# Patient Record
Sex: FEMALE | Race: BLACK OR AFRICAN AMERICAN | Employment: FULL TIME | URBAN - METROPOLITAN AREA
[De-identification: names, ages, dates, MRNs, and addresses within clinical notes are randomized per-mention and may not be internally consistent; named-entity substitution may affect disease eponyms.]

---

## 2024-02-07 ENCOUNTER — APPOINTMENT (OUTPATIENT)
Dept: RADIOLOGY | Facility: HOSPITAL | Age: 59
End: 2024-02-07
Payer: COMMERCIAL

## 2024-02-07 ENCOUNTER — HOSPITAL ENCOUNTER (EMERGENCY)
Facility: HOSPITAL | Age: 59
Discharge: HOME | End: 2024-02-07
Attending: EMERGENCY MEDICINE
Payer: COMMERCIAL

## 2024-02-07 ENCOUNTER — CLINICAL SUPPORT (OUTPATIENT)
Dept: EMERGENCY MEDICINE | Facility: HOSPITAL | Age: 59
End: 2024-02-07
Payer: COMMERCIAL

## 2024-02-07 VITALS
OXYGEN SATURATION: 98 % | HEIGHT: 64 IN | SYSTOLIC BLOOD PRESSURE: 149 MMHG | RESPIRATION RATE: 16 BRPM | DIASTOLIC BLOOD PRESSURE: 94 MMHG | TEMPERATURE: 98.2 F | BODY MASS INDEX: 30.73 KG/M2 | HEART RATE: 63 BPM | WEIGHT: 180 LBS

## 2024-02-07 DIAGNOSIS — S09.90XA HEAD INJURY, INITIAL ENCOUNTER: Primary | ICD-10-CM

## 2024-02-07 LAB
ALBUMIN SERPL BCP-MCNC: 4.5 G/DL (ref 3.4–5)
ALP SERPL-CCNC: 72 U/L (ref 33–110)
ALT SERPL W P-5'-P-CCNC: 25 U/L (ref 7–45)
ANION GAP SERPL CALC-SCNC: 12 MMOL/L (ref 10–20)
AST SERPL W P-5'-P-CCNC: 25 U/L (ref 9–39)
ATRIAL RATE: 71 BPM
BASOPHILS # BLD AUTO: 0.02 X10*3/UL (ref 0–0.1)
BASOPHILS NFR BLD AUTO: 0.4 %
BILIRUB SERPL-MCNC: 0.5 MG/DL (ref 0–1.2)
BUN SERPL-MCNC: 13 MG/DL (ref 6–23)
CALCIUM SERPL-MCNC: 9.8 MG/DL (ref 8.6–10.6)
CARDIAC TROPONIN I PNL SERPL HS: 10 NG/L (ref 0–34)
CHLORIDE SERPL-SCNC: 106 MMOL/L (ref 98–107)
CO2 SERPL-SCNC: 28 MMOL/L (ref 21–32)
CREAT SERPL-MCNC: 0.93 MG/DL (ref 0.5–1.05)
EGFRCR SERPLBLD CKD-EPI 2021: 72 ML/MIN/1.73M*2
EOSINOPHIL # BLD AUTO: 0.12 X10*3/UL (ref 0–0.7)
EOSINOPHIL NFR BLD AUTO: 2.2 %
ERYTHROCYTE [DISTWIDTH] IN BLOOD BY AUTOMATED COUNT: 12 % (ref 11.5–14.5)
GLUCOSE SERPL-MCNC: 97 MG/DL (ref 74–99)
HCT VFR BLD AUTO: 42.4 % (ref 36–46)
HGB BLD-MCNC: 13.7 G/DL (ref 12–16)
IMM GRANULOCYTES # BLD AUTO: 0 X10*3/UL (ref 0–0.7)
IMM GRANULOCYTES NFR BLD AUTO: 0 % (ref 0–0.9)
LYMPHOCYTES # BLD AUTO: 2.15 X10*3/UL (ref 1.2–4.8)
LYMPHOCYTES NFR BLD AUTO: 40 %
MCH RBC QN AUTO: 27.8 PG (ref 26–34)
MCHC RBC AUTO-ENTMCNC: 32.3 G/DL (ref 32–36)
MCV RBC AUTO: 86 FL (ref 80–100)
MONOCYTES # BLD AUTO: 0.32 X10*3/UL (ref 0.1–1)
MONOCYTES NFR BLD AUTO: 6 %
NEUTROPHILS # BLD AUTO: 2.76 X10*3/UL (ref 1.2–7.7)
NEUTROPHILS NFR BLD AUTO: 51.4 %
NRBC BLD-RTO: 0 /100 WBCS (ref 0–0)
P AXIS: 63 DEGREES
P OFFSET: 215 MS
P ONSET: 151 MS
PLATELET # BLD AUTO: 241 X10*3/UL (ref 150–450)
POTASSIUM SERPL-SCNC: 3.6 MMOL/L (ref 3.5–5.3)
PR INTERVAL: 156 MS
PROT SERPL-MCNC: 6.9 G/DL (ref 6.4–8.2)
Q ONSET: 229 MS
QRS COUNT: 12 BEATS
QRS DURATION: 92 MS
QT INTERVAL: 398 MS
QTC CALCULATION(BAZETT): 432 MS
QTC FREDERICIA: 421 MS
R AXIS: 54 DEGREES
RBC # BLD AUTO: 4.93 X10*6/UL (ref 4–5.2)
SODIUM SERPL-SCNC: 142 MMOL/L (ref 136–145)
T AXIS: 18 DEGREES
T OFFSET: 428 MS
VENTRICULAR RATE: 71 BPM
WBC # BLD AUTO: 5.4 X10*3/UL (ref 4.4–11.3)

## 2024-02-07 PROCEDURE — 99285 EMERGENCY DEPT VISIT HI MDM: CPT | Mod: 25

## 2024-02-07 PROCEDURE — 84484 ASSAY OF TROPONIN QUANT: CPT | Performed by: EMERGENCY MEDICINE

## 2024-02-07 PROCEDURE — 93005 ELECTROCARDIOGRAM TRACING: CPT

## 2024-02-07 PROCEDURE — 36415 COLL VENOUS BLD VENIPUNCTURE: CPT | Performed by: EMERGENCY MEDICINE

## 2024-02-07 PROCEDURE — 85025 COMPLETE CBC W/AUTO DIFF WBC: CPT | Performed by: EMERGENCY MEDICINE

## 2024-02-07 PROCEDURE — 99285 EMERGENCY DEPT VISIT HI MDM: CPT | Performed by: EMERGENCY MEDICINE

## 2024-02-07 PROCEDURE — 2500000004 HC RX 250 GENERAL PHARMACY W/ HCPCS (ALT 636 FOR OP/ED): Mod: SE | Performed by: STUDENT IN AN ORGANIZED HEALTH CARE EDUCATION/TRAINING PROGRAM

## 2024-02-07 PROCEDURE — 80053 COMPREHEN METABOLIC PANEL: CPT | Performed by: EMERGENCY MEDICINE

## 2024-02-07 PROCEDURE — 70450 CT HEAD/BRAIN W/O DYE: CPT

## 2024-02-07 PROCEDURE — 70450 CT HEAD/BRAIN W/O DYE: CPT | Performed by: RADIOLOGY

## 2024-02-07 PROCEDURE — 96372 THER/PROPH/DIAG INJ SC/IM: CPT

## 2024-02-07 RX ORDER — KETOROLAC TROMETHAMINE 30 MG/ML
30 INJECTION, SOLUTION INTRAMUSCULAR; INTRAVENOUS ONCE
Status: COMPLETED | OUTPATIENT
Start: 2024-02-07 | End: 2024-02-07

## 2024-02-07 RX ORDER — HYDROCHLOROTHIAZIDE 25 MG/1
25 TABLET ORAL
COMMUNITY
Start: 2023-11-29

## 2024-02-07 RX ORDER — LEVOTHYROXINE SODIUM 100 UG/1
100 TABLET ORAL
COMMUNITY
Start: 2023-12-03

## 2024-02-07 RX ADMIN — KETOROLAC TROMETHAMINE 30 MG: 30 INJECTION, SOLUTION INTRAMUSCULAR; INTRAVENOUS at 22:27

## 2024-02-07 ASSESSMENT — COLUMBIA-SUICIDE SEVERITY RATING SCALE - C-SSRS
2. HAVE YOU ACTUALLY HAD ANY THOUGHTS OF KILLING YOURSELF?: NO
1. IN THE PAST MONTH, HAVE YOU WISHED YOU WERE DEAD OR WISHED YOU COULD GO TO SLEEP AND NOT WAKE UP?: NO
6. HAVE YOU EVER DONE ANYTHING, STARTED TO DO ANYTHING, OR PREPARED TO DO ANYTHING TO END YOUR LIFE?: NO

## 2024-02-08 NOTE — ED TRIAGE NOTES
PT to the ED with a headache after running into a wall while walking. Additional c/o of dizziness and blurry vision.

## 2024-02-08 NOTE — DISCHARGE INSTRUCTIONS
You are noted to be slightly hypertensive on your vital signs.  I recommend you follow-up with the family's physician.  A primary care appointment can be made at [phone].    Additionally you were seen in the emergency department for head injury.  Your CT did not reveal any fracture/any head bleed.  You were administered pain medications for this headache.  If headache persists I recommend you follow-up with the concussion clinic.  Appointments can be made at [phone].  You can continue to use Tylenol/Motrin for associated headache.  Please return to the emergency department if you have any worsening symptoms such as numbness/weakness/syncope or near syncope.

## 2024-02-08 NOTE — ED PROVIDER NOTES
CC: Head Injury     HPI:  Patient a 58-year-old female presenting emergency room the chief concern of head injury.  Patient states that head injury occurred around approximately 2 AM.  Patient states that she was ambulating about her house without the lights on when she struck her left forehead against a wall.  Patient states that she had no loss of consciousness notes that she applied ice and lie down.  Patient reports that she had headache throughout the day, is concern for vague denies any loss of consciousness, denies blood thinners dizziness however denies specific vertigo.,  Denies any other associated injury with this episode.  Patient reports that she utilize migraine medication with some relief.  She otherwise denies any fevers/chills, changes in bowel or bladder function, nausea or vomiting, new focal numbness or weakness to bilateral arms, legs, face.    Records Reviewed:  Recent available ED and inpatient notes reviewed in EMR.    PMHx/PSHx:  Per HPI.   - has no past medical history on file.  - has no past surgical history on file.    Medications:  Reviewed in EMR. See EMR for complete list of medications and doses.    Allergies:  Patient has no known allergies.    Social History:  - Tobacco:  has no history on file for tobacco use.   - Alcohol:  has no history on file for alcohol use.   - Illicit Drugs:  has no history on file for drug use.     ROS:  Per HPI.       ???????????????????????????????????????????????????????????????  Triage Vitals:  T 36.4 °C (97.5 °F)  HR 86  BP (!) 178/116  RR 18  O2 100 %      Physical Exam  Vitals and nursing note reviewed.   Constitutional:       General: She is not in acute distress.     Appearance: Normal appearance. She is not toxic-appearing.   HENT:      Head: Normocephalic and atraumatic.      Comments: No noted ecchymoses, cephalhematoma appreciated on physical examination.     Nose: No congestion or rhinorrhea.      Mouth/Throat:      Mouth: Mucous membranes  are moist.      Pharynx: Oropharynx is clear.   Eyes:      Extraocular Movements: Extraocular movements intact.      Conjunctiva/sclera: Conjunctivae normal.      Pupils: Pupils are equal, round, and reactive to light.   Cardiovascular:      Rate and Rhythm: Normal rate and regular rhythm.      Pulses: Normal pulses.      Heart sounds: No murmur heard.     No friction rub. No gallop.   Pulmonary:      Effort: Pulmonary effort is normal.      Breath sounds: No wheezing, rhonchi or rales.   Abdominal:      General: There is no distension.      Palpations: Abdomen is soft.      Tenderness: There is no abdominal tenderness. There is no guarding or rebound.   Musculoskeletal:         General: No swelling, deformity or signs of injury. Normal range of motion.      Cervical back: Normal range of motion.      Right lower leg: No edema.      Left lower leg: No edema.   Skin:     General: Skin is warm and dry.      Capillary Refill: Capillary refill takes less than 2 seconds.      Findings: No bruising, lesion or rash.   Neurological:      General: No focal deficit present.      Mental Status: She is alert and oriented to person, place, and time. Mental status is at baseline.      Comments: Neuro:  MENTAL STATE:    Orientation was normal to person, place, and time. Recent and remote memory was intact. Attention span and concentration were intact. Language testing was grossly intact for comprehension and expression. Was able to name and identify 3 objects.    CRANIAL NERVES:    CN 2    Visual Acuity grossly normal, patient has not noted any changes   CN 3, 4, 6    Pupils equal and reactive to light   Lids symmetric; no ptosis. EOMs intact bilaterally    CN 5    Facial sensation intact bilaterally.    CN 7    Normal and symmetric facial strength. Nasolabial folds symmetric.    CN 8    Hearing grossly intact to conversation   CN 9    Palate elevates symmetrically.    CN 11               Able to use SCM and Trapezius against  resistance   CN 12    Tongue midline, with normal bulk and strength; no fasciculations.     MOTOR:      Upper extremity motor function intact to extension and flexion      Lower extremity motor function intact to extension and flexion    SENSORY:     Sensation intact in upper and lower extremities    GAIT:     Patient able to ambulate with normal gait    COORDINATION:     Coordination intact to finger-to-nose and fine motor movements of the hand   Psychiatric:         Mood and Affect: Mood normal.         Thought Content: Thought content normal.         Judgment: Judgment normal.       ???????????????????????????????????????????????????????????????  EKG:  EKG obtained at 2004 it is noted to be normal sinus rhythm with a ventricular rate of 71 bpm, NH interval of 156, QRS duration of 92, QTc of 432 there are no significant T wave inversions, no significant ST elevations, overall interpretation is a normal EKG with no signs of ischemia or infarction, no prior EKG availaable for comparison.    Assessment and Plan:  Patient is a 58-year-old female presented to the emergency department with a chief concern of head strike with no loss of consciousness, no reported blood thinner use.  She is presenting the emergency department for evaluation of head injury.  Patient reports no associated loss consciousness, no associated nausea/vomiting, no accompanying injury.  Prior to my evaluation patient has multiple labs and imaging modalities ordered in triage.  Patient has a CT head which is unremarkable for any skull fracture, intracranial hemorrhage.  Patient has no associated photophobia on examination patient has a normal gait, normal fine motor movements of the hand, normal to finger-nose testing with no associated nystagmus on physical examination.  Patient otherwise has 5/5 strength in bilateral upper and lower extremities, no sensory deficits, no other neurologic findings on physical examination.  Patient may have slight  concussion, patient does report relief of pain with over-the-counter pain medications which I recommend that she continue to take.  She will be provided with Toradol shot in the emergency department for headache.  She is advised to follow-up in concussion clinic if symptoms persist.  The patient is agreeable this plan agreeable to discharge and is discharged from the emergency department in stable condition.    ED Course:  Diagnoses as of 02/08/24 0853   Head injury, initial encounter      As noted above  Social Determinants Limiting Care:      Disposition:  Discharge    Jozef Francis MD       Procedures ? [employer] last updated 2/7/2024 10:08 PM        Jozef Francis MD  Resident  02/07/24 2225       Jozef Francis MD  Resident  02/08/24 0853

## 2024-08-19 ENCOUNTER — HOSPITAL ENCOUNTER (EMERGENCY)
Facility: HOSPITAL | Age: 59
Discharge: HOME | End: 2024-08-19
Payer: COMMERCIAL

## 2024-08-19 ENCOUNTER — APPOINTMENT (OUTPATIENT)
Dept: RADIOLOGY | Facility: HOSPITAL | Age: 59
End: 2024-08-19
Payer: COMMERCIAL

## 2024-08-19 VITALS
WEIGHT: 190 LBS | HEART RATE: 58 BPM | HEIGHT: 64 IN | OXYGEN SATURATION: 98 % | BODY MASS INDEX: 32.44 KG/M2 | RESPIRATION RATE: 20 BRPM | TEMPERATURE: 97.7 F | DIASTOLIC BLOOD PRESSURE: 114 MMHG | SYSTOLIC BLOOD PRESSURE: 166 MMHG
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
## 2024-08-19 DIAGNOSIS — S49.91XA INJURY OF RIGHT UPPER EXTREMITY, INITIAL ENCOUNTER: ICD-10-CM

## 2024-08-19 PROBLEM — I10 HTN (HYPERTENSION): Status: ACTIVE | Noted: 2019-08-19

## 2024-08-19 PROBLEM — R73.03 PRE-DIABETES: Status: ACTIVE | Noted: 2020-01-08

## 2024-08-19 PROBLEM — M25.561 ACUTE PAIN OF RIGHT KNEE: Status: ACTIVE | Noted: 2021-12-13

## 2024-08-19 PROCEDURE — 96372 THER/PROPH/DIAG INJ SC/IM: CPT | Performed by: NURSE PRACTITIONER

## 2024-08-19 PROCEDURE — 2500000004 HC RX 250 GENERAL PHARMACY W/ HCPCS (ALT 636 FOR OP/ED): Mod: SE | Performed by: NURSE PRACTITIONER

## 2024-08-19 PROCEDURE — 73080 X-RAY EXAM OF ELBOW: CPT | Mod: RT

## 2024-08-19 PROCEDURE — 73110 X-RAY EXAM OF WRIST: CPT | Mod: RT

## 2024-08-19 PROCEDURE — 73110 X-RAY EXAM OF WRIST: CPT | Mod: RIGHT SIDE | Performed by: RADIOLOGY

## 2024-08-19 PROCEDURE — 73030 X-RAY EXAM OF SHOULDER: CPT | Mod: RIGHT SIDE | Performed by: RADIOLOGY

## 2024-08-19 PROCEDURE — 73080 X-RAY EXAM OF ELBOW: CPT | Mod: RIGHT SIDE | Performed by: RADIOLOGY

## 2024-08-19 PROCEDURE — 73130 X-RAY EXAM OF HAND: CPT | Mod: RIGHT SIDE | Performed by: RADIOLOGY

## 2024-08-19 PROCEDURE — 73030 X-RAY EXAM OF SHOULDER: CPT | Mod: RT

## 2024-08-19 PROCEDURE — 73130 X-RAY EXAM OF HAND: CPT | Mod: RT

## 2024-08-19 PROCEDURE — 99284 EMERGENCY DEPT VISIT MOD MDM: CPT

## 2024-08-19 RX ORDER — KETOROLAC TROMETHAMINE 30 MG/ML
30 INJECTION, SOLUTION INTRAMUSCULAR; INTRAVENOUS ONCE
Status: COMPLETED | OUTPATIENT
Start: 2024-08-19 | End: 2024-08-19

## 2024-08-19 RX ORDER — NAPROXEN 500 MG/1
500 TABLET ORAL 2 TIMES DAILY PRN
Qty: 20 TABLET | Refills: 0 | Status: SHIPPED | OUTPATIENT
Start: 2024-08-19

## 2024-08-19 ASSESSMENT — PAIN SCALES - GENERAL: PAINLEVEL_OUTOF10: 10 - WORST POSSIBLE PAIN

## 2024-08-19 ASSESSMENT — PAIN - FUNCTIONAL ASSESSMENT: PAIN_FUNCTIONAL_ASSESSMENT: 0-10

## 2024-08-19 ASSESSMENT — PAIN DESCRIPTION - PAIN TYPE: TYPE: ACUTE PAIN

## 2024-08-19 NOTE — ED PROVIDER NOTES
HPI   Chief Complaint   Patient presents with    Fall       Patient is healthy nontoxic-appearing 58-year-old female with a past medical history of acute pain in the right knee, cervalgia, hypertension, hypothyroidism, lumbar ago, presents to the emergency today for complaint of fall.  Patient states she was tending to use a revolving door at a bank earlier today.  Patient states she pushed on a door and was thrown forward.  Patient states she injured her right shoulder, right elbow, right wrist and right hand in the process.  Patient denies striking her head, loss of consciousness, headache pain visual disturbances, numbness or tingling.  Patient states she has had intense pain to the right arm since the fall.  Patient is right-hand dominant.  Patient denies taking any medications prior to arrival.  Patient denies any neck pain, chest pain, shortness of breath difficulty breathing, lightheadedness, dizziness, syncopal or syncopal events, abdominal pain, nausea vomiting or diarrhea or constipation, fever, shaking, or chills.              Patient History   History reviewed. No pertinent past medical history.  History reviewed. No pertinent surgical history.  No family history on file.  Social History     Tobacco Use    Smoking status: Never    Smokeless tobacco: Never   Substance Use Topics    Alcohol use: Not Currently    Drug use: Not Currently       Physical Exam   ED Triage Vitals [08/19/24 1320]   Temperature Heart Rate Respirations BP   36.5 °C (97.7 °F) 58 20 (!) 166/114      Pulse Ox Temp Source Heart Rate Source Patient Position   98 % Oral Monitor --      BP Location FiO2 (%)     -- 21 %       Physical Exam  Vitals and nursing note reviewed.   Constitutional:       General: She is not in acute distress.     Appearance: Normal appearance. She is not ill-appearing, toxic-appearing or diaphoretic.   HENT:      Head: Normocephalic.   Eyes:      General:         Right eye: No discharge.         Left eye: No  discharge.      Extraocular Movements: Extraocular movements intact.      Pupils: Pupils are equal, round, and reactive to light.   Cardiovascular:      Rate and Rhythm: Normal rate and regular rhythm.      Pulses: Normal pulses.      Heart sounds: Normal heart sounds. No murmur heard.     No friction rub. No gallop.   Pulmonary:      Effort: Pulmonary effort is normal. No respiratory distress.      Breath sounds: Normal breath sounds. No stridor. No wheezing, rhonchi or rales.   Chest:      Chest wall: No tenderness.   Musculoskeletal:         General: Tenderness and signs of injury present. No swelling or deformity. Normal range of motion.      Cervical back: Normal range of motion and neck supple.      Right lower leg: No edema.      Left lower leg: No edema.      Comments: Diffuse pain with light palpation over the right shoulder, right elbow, right wrist and right hand, decreased range of motion secondary to discomfort, radial pulses strong regular, hand grasp strong regular, no anatomical snuffbox tenderness upon palpation,   Skin:     General: Skin is warm.      Capillary Refill: Capillary refill takes less than 2 seconds.      Coloration: Skin is not jaundiced or pale.      Findings: No bruising, erythema, lesion or rash.   Neurological:      General: No focal deficit present.      Mental Status: She is alert and oriented to person, place, and time.           ED Course & MDM   ED Course as of 08/19/24 1700   Mon Aug 19, 2024   1652 X-rays of the right shoulder, right elbow, right hand and right wrist reveal  Right shoulder:  There is no acute fracture or malalignment. There are mild  degenerative changes at glenohumeral and acromioclavicular joint. The  soft tissue appears unremarkable.      Right elbow:  There is no acute fracture or malalignment. No joint effusion is  seen. The soft tissue appears unremarkable.      Right hand:  There is no acute fracture or malalignment. The joint spaces are  maintained  without significant degenerative changes. Soft tissue  appears unremarkable.      Right wrist:  There is no acute fracture or malalignment. The soft tissue appears  unremarkable.   [EC]      ED Course User Index  [EC] HANANE Stoner         Diagnoses as of 08/19/24 1700   Fall, initial encounter   Injury of right upper extremity, initial encounter                 No data recorded     Melchor Coma Scale Score: 15 (08/19/24 1323 : Kimberly Ramirez RN)                           Medical Decision Making  Given patient's complaint and presentation a thorough exam was performed.  On exam patient has Diffuse pain with light palpation over the right shoulder, right elbow, right wrist and right hand, decreased range of motion secondary to discomfort, radial pulses strong regular, hand grasp strong regular, no anatomical snuffbox tenderness upon palpation, remains neurologically intact no focal deficits, no nuchal rigidity, has no midline cervical, thoracic or lumbar spinal tenderness no crepitus no step-offs upon palpation, no adventitious lung sounds auscultated, speaking pleat sentences no respiratory distress, cardiac sounds auscultated are regular, I have a low suspicion for vascular compromise, flexor extensor tendon injury.  X-ray was ordered of the right shoulder, right elbow, wrist and hand.  Imaging as interpreted by radiologist reveals no acute osseous abnormality, fracture or dislocation.  Patient received Toradol injection in the emergency room today as well.  Given mechanism injury I suspect patient is experiencing sprain of the right arm secondary to fall.  Patient received scription for naproxen and I encouraged monitoring symptoms, if they become worse return to the emergency room for further evaluation, otherwise follow primary care provider as needed.  Patient was agreeable this plan and discharged home in stable condition.    HANANE Stoner     Portions of this note were generated using  digital voice recognition software, and may contain grammatical errors      Procedure  Procedures     Kevin Pozo, APRN-CNP  08/19/24 7631

## 2024-08-19 NOTE — ED TRIAGE NOTES
"Pt states [place] [employer] [place] and when she went to exit building the revolving door \"threw her\" pt states her lower back and right arm and hand as well as her left leg. Pt is hypertensive but did not take her bp meds this morning.   "

## 2024-08-21 ENCOUNTER — HOSPITAL ENCOUNTER (EMERGENCY)
Facility: HOSPITAL | Age: 59
Discharge: HOME | End: 2024-08-21
Attending: EMERGENCY MEDICINE
Payer: COMMERCIAL

## 2024-08-21 VITALS
TEMPERATURE: 96.8 F | BODY MASS INDEX: 31.41 KG/M2 | OXYGEN SATURATION: 95 % | RESPIRATION RATE: 16 BRPM | SYSTOLIC BLOOD PRESSURE: 149 MMHG | HEART RATE: 76 BPM | WEIGHT: 184 LBS | DIASTOLIC BLOOD PRESSURE: 100 MMHG | HEIGHT: 64 IN

## 2024-08-21 DIAGNOSIS — S60.221A CONTUSION OF DORSUM OF RIGHT HAND: Primary | ICD-10-CM

## 2024-08-21 PROCEDURE — 96372 THER/PROPH/DIAG INJ SC/IM: CPT | Performed by: PHYSICIAN ASSISTANT

## 2024-08-21 PROCEDURE — 2500000004 HC RX 250 GENERAL PHARMACY W/ HCPCS (ALT 636 FOR OP/ED): Mod: SE | Performed by: PHYSICIAN ASSISTANT

## 2024-08-21 PROCEDURE — 99283 EMERGENCY DEPT VISIT LOW MDM: CPT

## 2024-08-21 PROCEDURE — 99284 EMERGENCY DEPT VISIT MOD MDM: CPT | Performed by: PHYSICIAN ASSISTANT

## 2024-08-21 RX ORDER — KETOROLAC TROMETHAMINE 30 MG/ML
30 INJECTION, SOLUTION INTRAMUSCULAR; INTRAVENOUS ONCE
Status: COMPLETED | OUTPATIENT
Start: 2024-08-21 | End: 2024-08-21

## 2024-08-21 ASSESSMENT — PAIN DESCRIPTION - PAIN TYPE: TYPE: ACUTE PAIN

## 2024-08-21 ASSESSMENT — PAIN SCALES - WONG BAKER: WONGBAKER_NUMERICALRESPONSE: HURTS WHOLE LOT

## 2024-08-21 ASSESSMENT — PAIN DESCRIPTION - LOCATION: LOCATION: WRIST

## 2024-08-21 ASSESSMENT — PAIN DESCRIPTION - ORIENTATION
ORIENTATION: RIGHT
ORIENTATION: RIGHT

## 2024-08-21 ASSESSMENT — PAIN - FUNCTIONAL ASSESSMENT: PAIN_FUNCTIONAL_ASSESSMENT: 0-10

## 2024-08-21 ASSESSMENT — PAIN SCALES - GENERAL
PAINLEVEL_OUTOF10: 7
PAINLEVEL_OUTOF10: 10 - WORST POSSIBLE PAIN

## 2024-08-21 NOTE — ED TRIAGE NOTES
Patient presents to the Emergency department with a chief complaint of right hand pain. Patient states she fell while opening a door yesterday. She was seen for this, but her hand is continuing to hurt.

## 2024-08-21 NOTE — ED PROVIDER NOTES
Emergency Department Provider Note        History of Present Illness     History provided by: Patient  Limitations to History: None  External Records Reviewed with Brief Summary: visit and imaging from 8/18    HPI:  Patient is a 58-year-old female who presents today for right hand pain, patient was involved in an accident 2 days ago where she was going for a revolving door and the door jerked forward causing her to fall forward, patient states she landed with her right arm tucked under her, she states the rest of her pain in her arm is getting better but the hand is still swollen, patient is wondering if she can get a splint.  She states it is only the top of the hand, she is endorses it is worse with movement, she has been taking naproxen every 12 hours as prescribed but states she is due for another dose currently.  She states the Toradol really helped last time.  Denies any new injuries or traumas.  No numbness or tingling or pain that is severe.    Physical Exam   Triage vitals:  T 36 °C (96.8 °F)  HR 76  BP (!) 171/107  RR 16  O2 95 % None (Room air)    Physical Exam  Vitals and nursing note reviewed.   Constitutional:       General: She is not in acute distress.     Appearance: Normal appearance. She is not toxic-appearing.   HENT:      Head: Normocephalic and atraumatic.      Nose: Nose normal.   Eyes:      Extraocular Movements: Extraocular movements intact.   Cardiovascular:      Rate and Rhythm: Normal rate and regular rhythm.   Pulmonary:      Effort: Pulmonary effort is normal.   Abdominal:      Palpations: Abdomen is soft.   Musculoskeletal:         General: Normal range of motion.      Cervical back: Normal range of motion and neck supple.      Comments:     Mild swelling to the dorsal right hand, no bruising warmth or erythema, soft compressible compartments, 2+ radial pulse, cap refill to all 5 digits intact, cap refill under 2 seconds, patient has full flexion extension at MCP PIP and DIP at all  5 digits including against resistance, no snuffbox tenderness, no tenderness to the wrist forearm or elbow.     Skin:     General: Skin is warm and dry.   Neurological:      General: No focal deficit present.      Mental Status: She is alert.   Psychiatric:         Mood and Affect: Mood normal.         Thought Content: Thought content normal.          Medical Decision Making & ED Course   Medical Decision Making:    MDM: Patient is a 58-year-old female presents today for evaluation of ongoing right hand pain after being seen for an injury where she fell onto her hand 2 days ago, she had an x-ray at the time that was negative, patient's pain is mostly along the dorsal aspect of the hand, she was more concerned that it was swollen, she is requesting a splint, given that patient has no fractures do not feel that it would be beneficial to immobilize her however I did offer her an Ace wrap which she gladly accepted, this was applied by myself, neurovascularly intact following application, do not feel that there is any indication for repeat imaging, no concern for vascular, neurological compromise, no concerns for compartment syndrome or occult fracture.  Patient provided with an ice pack, Ace wrap, will give her hand surgery follow-up in the event of ongoing pain or symptoms. D/w Dr Silverman given second visit for same complaint and he agrees with plan of care. Repeat /100, suspect mildly elevated secondary to pain.  ----      Differential diagnoses considered include but are not limited to: contusion, sprain, fracture, compartment syndrome, hematoma, strain     Social Determinants of Health which Significantly Impact Care: None identified     EKG Independent Interpretation: EKG interpreted by myself. Please see ED Course for full interpretation.    Independent Result Review and Interpretation: None obtained    Chronic conditions affecting the patient's care: None    The patient was discussed with the following  consultants/services: None    Care Considerations: As documented above in [facility]    ED Course:  Diagnoses as of 08/21/24 1831   Contusion of dorsum of right hand     Disposition   As a result of the work-up, the patient was discharged home.  she was informed of her diagnosis and instructed to come back with any concerns or worsening of condition.  she and was agreeable to the plan as discussed above.  she was given the opportunity to ask questions.  All of the patient's questions were answered.    Procedures   Procedures    This was a shared visit with an ED attending.  The patient was seen and discussed with the ED attending    Jessica Rich PA-C  Emergency Medicine       Jessica Rich PA-C  08/21/24 2040

## 2024-10-10 ENCOUNTER — APPOINTMENT (OUTPATIENT)
Dept: RADIOLOGY | Facility: HOSPITAL | Age: 59
End: 2024-10-10
Payer: COMMERCIAL

## 2024-10-10 ENCOUNTER — HOSPITAL ENCOUNTER (EMERGENCY)
Facility: HOSPITAL | Age: 59
Discharge: HOME | End: 2024-10-10
Payer: COMMERCIAL

## 2024-10-10 VITALS
OXYGEN SATURATION: 99 % | WEIGHT: 189 LBS | TEMPERATURE: 98.6 F | SYSTOLIC BLOOD PRESSURE: 149 MMHG | BODY MASS INDEX: 32.27 KG/M2 | HEIGHT: 64 IN | DIASTOLIC BLOOD PRESSURE: 88 MMHG | HEART RATE: 82 BPM | RESPIRATION RATE: 16 BRPM

## 2024-10-10 DIAGNOSIS — S60.511D ABRASION OF RIGHT HAND, SUBSEQUENT ENCOUNTER: Primary | ICD-10-CM

## 2024-10-10 PROCEDURE — 73130 X-RAY EXAM OF HAND: CPT | Mod: RT

## 2024-10-10 PROCEDURE — 73130 X-RAY EXAM OF HAND: CPT | Mod: RIGHT SIDE | Performed by: RADIOLOGY

## 2024-10-10 PROCEDURE — 99284 EMERGENCY DEPT VISIT MOD MDM: CPT

## 2024-10-10 PROCEDURE — 96372 THER/PROPH/DIAG INJ SC/IM: CPT

## 2024-10-10 PROCEDURE — 2500000004 HC RX 250 GENERAL PHARMACY W/ HCPCS (ALT 636 FOR OP/ED): Mod: SE

## 2024-10-10 PROCEDURE — 99283 EMERGENCY DEPT VISIT LOW MDM: CPT

## 2024-10-10 RX ORDER — ACETAMINOPHEN 325 MG/1
650 TABLET ORAL EVERY 6 HOURS PRN
Qty: 20 TABLET | Refills: 0 | Status: SHIPPED | OUTPATIENT
Start: 2024-10-10 | End: 2024-10-15

## 2024-10-10 RX ORDER — IBUPROFEN 600 MG/1
600 TABLET ORAL EVERY 6 HOURS PRN
Qty: 16 TABLET | Refills: 0 | Status: SHIPPED | OUTPATIENT
Start: 2024-10-10 | End: 2024-10-14

## 2024-10-10 RX ORDER — KETOROLAC TROMETHAMINE 30 MG/ML
30 INJECTION, SOLUTION INTRAMUSCULAR; INTRAVENOUS ONCE
Status: COMPLETED | OUTPATIENT
Start: 2024-10-10 | End: 2024-10-10

## 2024-10-10 RX ADMIN — KETOROLAC TROMETHAMINE 30 MG: 30 INJECTION, SOLUTION INTRAMUSCULAR; INTRAVENOUS at 09:16

## 2024-10-10 ASSESSMENT — COLUMBIA-SUICIDE SEVERITY RATING SCALE - C-SSRS
6. HAVE YOU EVER DONE ANYTHING, STARTED TO DO ANYTHING, OR PREPARED TO DO ANYTHING TO END YOUR LIFE?: NO
2. HAVE YOU ACTUALLY HAD ANY THOUGHTS OF KILLING YOURSELF?: NO
1. IN THE PAST MONTH, HAVE YOU WISHED YOU WERE DEAD OR WISHED YOU COULD GO TO SLEEP AND NOT WAKE UP?: NO

## 2024-10-10 ASSESSMENT — PAIN - FUNCTIONAL ASSESSMENT: PAIN_FUNCTIONAL_ASSESSMENT: 0-10

## 2024-10-10 ASSESSMENT — PAIN SCALES - GENERAL
PAINLEVEL_OUTOF10: 8
PAINLEVEL_OUTOF10: 8

## 2024-10-10 NOTE — ED PROVIDER NOTES
HPI   Chief Complaint   Patient presents with    Hand Pain       59-year-old female presents for chief complaint of hand pain.  Worse in the right third digit.  Ongoing since fall in August.  X-rays were obtained at that time and were negative for any fractures or dislocations.  Patient was recommended to follow-up with hand surgery but she has not done that yet.  Endorses persistent pain mostly in her middle finger but now extending and radiating into her hand.  Endorses some mild swelling but without redness in the middle finger.  Denies any other complaints.              Patient History   No past medical history on file.  No past surgical history on file.  No family history on file.  Social History     Tobacco Use    Smoking status: Never    Smokeless tobacco: Never   Substance Use Topics    Alcohol use: Not Currently    Drug use: Not Currently       Physical Exam   ED Triage Vitals [10/10/24 0834]   Temperature Heart Rate Respirations BP   36.1 °C (96.9 °F) 85 17 (!) 161/95      Pulse Ox Temp Source Heart Rate Source Patient Position   97 % Temporal Monitor --      BP Location FiO2 (%)     -- --       Physical Exam  Vitals and nursing note reviewed.   Constitutional:       General: She is not in acute distress.     Appearance: She is well-developed.   HENT:      Head: Normocephalic and atraumatic.   Eyes:      Conjunctiva/sclera: Conjunctivae normal.   Cardiovascular:      Rate and Rhythm: Normal rate and regular rhythm.      Heart sounds: No murmur heard.  Pulmonary:      Effort: Pulmonary effort is normal. No respiratory distress.      Breath sounds: Normal breath sounds.   Abdominal:      Palpations: Abdomen is soft.      Tenderness: There is no abdominal tenderness.   Musculoskeletal:         General: No swelling.      Cervical back: Neck supple.      Comments: Mild swelling without redness and mild tenderness to the right middle finger.  Range of motion slightly decreased with extension in this finger.  All  other range of motion intact.  MSPs intact in all extremities.  Cap refill less than 2 seconds in all digits.   Skin:     General: Skin is warm and dry.      Capillary Refill: Capillary refill takes less than 2 seconds.   Neurological:      Mental Status: She is alert.   Psychiatric:         Mood and Affect: Mood normal.           ED Course & MDM   Diagnoses as of 10/10/24 1046   Abrasion of right hand, subsequent encounter                 No data recorded     Melchor Coma Scale Score: 15 (10/10/24 0835 : Ivory Fish RN)                           Medical Decision Making  Vital signs reviewed, significant for hypertension at 161/95.  Patient overall is well-appearing and in no apparent distress.  Speaks full sentences but difficulty.  Toradol given for pain control.  Focus splint given per patient's request.  X-rays were repeated due to persistent pain and new swelling in the right middle finger.  Denies any new injuries since August.  Low suspicion of fracture at this point.  Likely needs to follow-up with hand surgery.  Will be given follow-up at discharge.  X-ray shows no evidence of acute fractures or dislocations but does show some soft tissue swelling.  Patient was advised to follow-up with hand surgery soon as possible and to take Tylenol Motrin as needed for pain.  Advised to return with any new or worsening symptoms.  Patient in agreement this plan.  Discharged in stable condition.        Procedure  Procedures     Bharat Cook, APRN-CNP  10/10/24 5983

## 2024-10-21 ENCOUNTER — APPOINTMENT (OUTPATIENT)
Dept: ORTHOPEDIC SURGERY | Facility: HOSPITAL | Age: 59
End: 2024-10-21
Payer: COMMERCIAL

## 2024-10-29 ENCOUNTER — APPOINTMENT (OUTPATIENT)
Dept: ORTHOPEDIC SURGERY | Facility: HOSPITAL | Age: 59
End: 2024-10-29
Payer: COMMERCIAL

## 2024-11-11 ENCOUNTER — OFFICE VISIT (OUTPATIENT)
Dept: ORTHOPEDIC SURGERY | Facility: HOSPITAL | Age: 59
End: 2024-11-11
Payer: COMMERCIAL

## 2024-11-11 DIAGNOSIS — M79.641 RIGHT HAND PAIN: Primary | ICD-10-CM

## 2024-11-11 PROCEDURE — 99203 OFFICE O/P NEW LOW 30 MIN: CPT | Performed by: STUDENT IN AN ORGANIZED HEALTH CARE EDUCATION/TRAINING PROGRAM

## 2024-11-11 PROCEDURE — 99213 OFFICE O/P EST LOW 20 MIN: CPT | Performed by: STUDENT IN AN ORGANIZED HEALTH CARE EDUCATION/TRAINING PROGRAM

## 2024-11-11 ASSESSMENT — PAIN - FUNCTIONAL ASSESSMENT: PAIN_FUNCTIONAL_ASSESSMENT: 0-10

## 2024-11-11 ASSESSMENT — PAIN SCALES - GENERAL: PAINLEVEL_OUTOF10: 9

## 2024-11-11 ASSESSMENT — PAIN DESCRIPTION - DESCRIPTORS: DESCRIPTORS: TINGLING;NUMBNESS

## 2024-11-25 NOTE — PROGRESS NOTES
[facility]   Hand and Upper Extremity Service  Initial evaluation / Consultation        Consult requested by Referring Physician: None     Chief Complaint: Right upper extremity pain     Patient is a 59-year-old female who presents with right upper extremity pain.  She notes that on October 19, 2024 she had a fall at the [employer] building in [place].  She states that she was leaving the bank when she went through a revolving doors.  She states that the door stopped or slowed down in front of her and when she put out her arm she felt that she got electrocuted.  I clarified if she meant that she felt a shock or shooting type pain or if she truly thought she was electrocuted.  She states that she thinks she was truly electrocuted.  She states she fell on the floor and has had severe pain to her right upper extremity since then.  She notes that is traveling down her whole upper extremity.  She does note a numbness and tingling feeling.  She states this is a 9 out of 10.     She is right-hand dominant.  She works in childcare.     Please refer to New Patient Intake Form scanned into patient's electronic record for self reported past medical history, past surgical history, medications, allergies, family history, social history and 10 point review of systems     Examination:  Constitutional: Oriented to person, place, and time.  Appears well-developed and well-nourished.  Head: Normocephalic and atraumatic.  Eyes: Pupils are equal, round, and reactive to light.  Cardiovascular: Intact distal pulses.   Pulmonary/Chest/Breast: Effort normal. No respiratory distress.  Neurological: Alert and oriented to person, place, and time.  Skin: Skin is warm and dry.  Psychiatric: normal mood and affect.  Behavior is normal.  Musculoskeletal: Right upper extremity  There are no deformities present.  There is no abnormal markings on her skin or burns.  Her right upper extremity is symmetrical to her  left upper extremity.  She is able to flex and extend the MP and IP joints.  She is able to fully move her wrist elbow and shoulder.  She has tender to palpation diffusely.  She has a positive Tinel's at her carpal tunnel, cubital tunnel, Guyon's canal, and diffusely.  This does not follow any specific nerve or dermatome distributions.  AIN, PIN, ulnar motor nerves intact.  Sensation intact to light touch radial, ulnar, median nerves.       Personal Interpretation of Diagnostic studies:   No new images were obtained.  Previous x-rays of her right upper extremity reviewed.  There are no fractures or dislocations noted         Impression: Right upper extremity pain         In Office Procedures Performed: None         Medical Decision Making:   Unfortunately cannot delineate where her pain is coming from on exam.  She is diffusely tender to the right upper extremity.  There is concerned that she works in childcare and uses her right hand.  At this point I will refer her to occupational therapy to see if they can provide her with any assistance.  She will follow-up she did not see any improvement.       Medications Prescribed: None        Follow up: As needed           Will DO Mis  [facility]  Department of Orthopaedic Surgery  Hand and Upper Extremity Reconstruction           Dictation performed with the use of voice recognition software.  Syntax and grammatical errors may exist.

## 2024-11-27 ENCOUNTER — DOCUMENTATION (OUTPATIENT)
Dept: OCCUPATIONAL THERAPY | Facility: CLINIC | Age: 59
End: 2024-11-27
Payer: COMMERCIAL

## 2024-11-27 NOTE — PROGRESS NOTES
Occupational Therapy                 Therapy Communication Note    Patient Name: Dianne Barahona  MRN: [id number]  Department:   Room: Room/bed info not found  Today's Date: 11/27/2024     Discipline: Occupational Therapy    Missed Time: No Show    Comment:  OT evaluation

## 2024-12-17 ENCOUNTER — APPOINTMENT (OUTPATIENT)
Dept: OCCUPATIONAL THERAPY | Facility: HOSPITAL | Age: 59
End: 2024-12-17
Payer: COMMERCIAL

## 2024-12-26 ENCOUNTER — EVALUATION (OUTPATIENT)
Dept: OCCUPATIONAL THERAPY | Facility: HOSPITAL | Age: 59
End: 2024-12-26
Payer: COMMERCIAL

## 2024-12-26 DIAGNOSIS — M79.641 RIGHT HAND PAIN: Primary | ICD-10-CM

## 2024-12-26 PROCEDURE — 97166 OT EVAL MOD COMPLEX 45 MIN: CPT | Mod: GO

## 2024-12-26 PROCEDURE — 97110 THERAPEUTIC EXERCISES: CPT | Mod: GO

## 2024-12-26 ASSESSMENT — ENCOUNTER SYMPTOMS
LOSS OF SENSATION IN FEET: 0
OCCASIONAL FEELINGS OF UNSTEADINESS: 0
DEPRESSION: 0

## 2024-12-26 NOTE — PROGRESS NOTES
Occupational Therapy Evaluation    Patient Name: Dianne Barahona  MRN: [id number]  Today's Date: 12/26/2024  Time Calculation  Start Time: 0945  Stop Time: 1045  Time Calculation (min): 60 min  Problem List Items Addressed This Visit    None  Visit Diagnoses         Codes    Right hand pain    -  Primary M79.641    Relevant Orders    Follow Up In Occupational Therapy             Insurance  Visit 1 of 30  Authorization: required at 30th visits  Insurance plan: Payor: [employer] [facility] / Plan: [employer] [facility] / Product Type: *No Product type* /     Assessment: 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 She reports severe pain and intermittent sensory issues as well as weakness. She has ORL tightness and negative Richard's tests of her right middle, ring, and small fingers, with the middle finger demonstrating a boutonierre deformity that cannot passively be corrected like the ring and small fingers can. All of these symptoms make I/ADL difficult. I got her started on a home program that addresses these issues. She will benefit from ongoing occupational therapy in order to get back to safe I/ADL performance and her prior level of function, including returning to work.     Plan: RMO orthosis, therapeutic exercise, therapeutic activity, self-care, home management, manual therapy, neuromuscular coordination, vasopneumatic, electric stimulation, fluidotherapy, ultrasound, kinesiotaping, orthosis fabrication, wound/scar/edema care  Goals  In 8-10 weeks, Dianne will achieve the following goals:  She will be able to perform self-care, household, work, and leisure tasks with lower pain (1-3/10), 75% of the time.  She will improve right finger range of motion in order to perform self-care, household, work and leisure tasks: D2-5 PATHAK = 225 degrees.  She will decrease her QuickDASH score by 20-30 points  (70.45 at eval).  Patient's goals for therapy: get hand better and use it like she had been    Plan of care was developed with input and agreement by the patient  Frequency: 1 x Week  Duration: 10 weeks    Precautions:  Movement Restrictions: No:  Weight Bearing Status: As tolerated    Subjective   Not working currently. 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 Hand keeps swelling up and she can't use it because it gives out. Swollen currently. Also feels shocking pins and needles, hurts real bad. Got hand heat things that work a little bit and stress ball. Hand doesn't lie flat, she pushed on it to flatten it, something clicked and it hurt really bad.   Can't: drive, hold a baby, open a tight jar. Things fall out of her hand and she doesn't feel it like a pen, or she has to put things down because hand is weak like a pot.    Onset: [place] [employer] [place] onto her R hand.    Prior level of function   Full functional movement and use of her R hand, no recollection of ever hurting her RUE in the past    Patient Intake and Medical History form reviewed    Pain  8/10 currently, always hurts, throbs     Objective   Outcome Measure: QuickDASH: 70.45    Edema: 7.3 cm R MF PIP jt (L 5.8 cm)    Sensation  Gets numb: posterior MF and back of hand only, does not travel    Neuro exam:  Radial nerve: 5/5 strength in thumb extension, sensation intact to dorsal surface of thumb/index web space  Median nerve: 5/5 strength in thumb abduction and 3/5 opposition, sensation intact to palmar surface of index finger  Ulnar nerve: 3/5 strength in thumb/little finger approximation, sensation intact to palmar surface of little finger     AROM  Shoulder: starting to bother her too, no issue prior to fall    Elbow: WNL    Wrist: WFL, but feels painful  pull on dorsum of R hand when flexing wrist, and pain when extending wrist (less) center of dorsal wrist    Right Hand AROM (degrees)   MCP PIP DIP DPC   IF  0/ 0/      MF 0/58 -46/92 0/49 153   RF 0/ -26/     SF 0/ -49/     Thumb       Thumb RABD       Thumb PABD         Right Hand PROM (degrees)   MCP PIP DIP DPC   IF        MF  -36/ (-26/ p tx)     RF  0/     SF  0/     Thumb       Thumb RABD       Thumb PABD           Strength RUE TBE  Elbow:    flexion /5  extension /5  supination /5  pronation /5    Wrist:  flexion /5  extension /5  radial deviation /5  ulnar deviation /5  pronation /5  supination /5    Hand:   strength (pos 2):     L , R   pinch strength: 3 point: L , R                             roach:      L , R      Special Tests  Finger/Thumb:  ORL Tightness: POS  Richard's Test: NEG      Treatment  Education: home exercise program, plan of care, activity modification, pain management, and pertinent anatomy     Modalities: Moist heat to R forearm/wrist/hand to prepare for treatment  Manual: Trigger point releases about hand, intrinsic hand muscle stretching for pain relief and increased ROM  Fitted her with and issued small and medium tipless compression gloves  Therapeutic Exercise: Instructed Dianne in her boutonierre routine: blocking R MF/RF/SF MP jt and P1 (palm down) in extension while attempting to lift fingertips off the table to strengthen EDC, DIP flexion with PIP extension blocking, and lumbrical strengthening (MP jts kept flexed with other hand while extending IP joints) x 25 reps each 2x/day. I video'd her doing them correctly with her phone.      OT Evaluation Time Entry  OT Evaluation (Moderate) Time Entry: 30  OT Therapeutic Procedures Time Entry  Manual Therapy Time Entry: 10  Therapeutic Exercise Time Entry: 10  OT Modalities Time Entry  Hot/Cold Pack Time Entry: 10

## 2025-01-03 ENCOUNTER — TREATMENT (OUTPATIENT)
Dept: OCCUPATIONAL THERAPY | Facility: HOSPITAL | Age: 60
End: 2025-01-03
Payer: COMMERCIAL

## 2025-01-03 DIAGNOSIS — M79.641 RIGHT HAND PAIN: ICD-10-CM

## 2025-01-03 PROCEDURE — L3933 FO W/O JOINTS CF: HCPCS

## 2025-01-03 NOTE — PROGRESS NOTES
Occupational Therapy Evaluation    Patient Name: Dianne Barahona  MRN: [id number]  Today's Date: 1/3/2025  Time Calculation  Start Time: 1000  Stop Time: 1030  Time Calculation (min): 30 min  Problem List Items Addressed This Visit    None  Visit Diagnoses         Codes    Right hand pain     M79.641             Insurance  Visit 2  of 30  Authorization: required at 30th visits  Insurance plan: Payor: [place] [employer] [facility] / Plan: [employer] [facility] / Product Type: *No Product type* /     Assessment: Dianne gained some PIP extension by doing her home program. I fabricated a custom relative motion flexion orthosis to increase     Plan: RMO orthosis, therapeutic exercise, therapeutic activity, self-care, home management, manual therapy, neuromuscular coordination, vasopneumatic, electric stimulation, fluidotherapy, ultrasound, kinesiotaping, orthosis fabrication, wound/scar/edema care  Goals  In 8-10 weeks, Dianne will achieve the following goals:  She will be able to perform self-care, household, work, and leisure tasks with lower pain (1-3/10), 75% of the time.  She will improve right finger range of motion in order to perform self-care, household, work and leisure tasks: D2-5 PATHAK = 225 degrees.  She will decrease her QuickDASH score by 20-30 points (70.45 at eval).  Patient's goals for therapy: get hand better and use it like she had been    Plan of care was developed with input and agreement by the patient  Frequency: 1 x Week  Duration: 10 weeks    Precautions:  Movement Restrictions: No:  Weight Bearing Status: As tolerated    Subjective   Not working currently. 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 Hand keeps swelling up and she can't use it because it gives out. Swollen currently. Also feels shocking pins and needles, hurts real bad. Got hand heat things that work a little bit and stress ball. Hand doesn't lie flat, she pushed on it to flatten it, something clicked and  it hurt really bad.   Can't: drive, hold a baby, open a tight jar. Things fall out of her hand and she doesn't feel it like a pen, or she has to put things down because hand is weak like a pot.    Onset: [place] [employer] [place] onto her R hand.    Prior level of function   Full functional movement and use of her R hand, no recollection of ever hurting her RUE in the past    Patient Intake and Medical History form reviewed    Pain  8/10 currently, always hurts, throbs     Objective   Outcome Measure: QuickDASH: 70.45    Edema: 7.3 cm R MF PIP jt (L 5.8 cm)    Sensation  Gets numb: posterior MF and back of hand only, does not travel    Neuro exam:  Radial nerve: 5/5 strength in thumb extension, sensation intact to dorsal surface of thumb/index web space  Median nerve: 5/5 strength in thumb abduction and 3/5 opposition, sensation intact to palmar surface of index finger  Ulnar nerve: 3/5 strength in thumb/little finger approximation, sensation intact to palmar surface of little finger     AROM  Shoulder: starting to bother her too, no issue prior to fall    Elbow: WNL    Wrist: WFL but feels painful pull on dorsum of R hand when flexing wrist, and pain (less) at center of dorsal wrist when extending wrist    Right Hand AROM (degrees)   MCP PIP DIP PATHAK   IF  0/ 0/     MF 0/58 -40/92 0/49 160   RF 0/ -28/     SF 0/ -33/     Thumb       Thumb RABD       Thumb PABD         Right Hand PROM (degrees)   MCP PIP DIP DPC   IF        MF  -36/      RF  0/     SF  0/     Thumb       Thumb RABD       Thumb PABD           Strength RUE TBE  Elbow:    flexion /5  extension /5  supination /5  pronation /5    Wrist:  flexion /5  extension /5  radial deviation /5  ulnar deviation /5  pronation /5  supination /5    Hand:   strength (pos 2):     L , R   pinch strength: 3 point: L , R                             roach:      L , R      Special Tests  Finger/Thumb:  ORL Tightness: POS  Richard's Test: NEG      Treatment  Education: home  exercise program, plan of care, activity modification, pain management, and pertinent anatomy     Orthosis (30 min): Fabricated hand-based thermoplastic relative motion flexion orthosis holding the R MF/RF/SF MP joints in greater flexion than the IF for improved ability to extend those PIP joints while strengthening them. I instructed her in its use (full time during the day) and care. Reassessed AROM, symptoms, functional status, HEP compliance.    HEP: (video on phone) boutonierre routine: blocking R MF/RF/SF MP jt and P1 (palm down) in extension while attempting to lift fingertips off the table to strengthen EDC, DIP flexion with PIP extension blocking, and lumbrical strengthening (MP jts kept flexed with other hand while extending IP joints) x 25 reps each 2x/day    OT charges:  Custom finger orthosis without joints

## 2025-01-09 ENCOUNTER — APPOINTMENT (OUTPATIENT)
Dept: OCCUPATIONAL THERAPY | Facility: HOSPITAL | Age: 60
End: 2025-01-09
Payer: COMMERCIAL

## 2025-01-10 ENCOUNTER — TREATMENT (OUTPATIENT)
Dept: OCCUPATIONAL THERAPY | Facility: HOSPITAL | Age: 60
End: 2025-01-10
Payer: COMMERCIAL

## 2025-01-10 DIAGNOSIS — M79.641 RIGHT HAND PAIN: ICD-10-CM

## 2025-01-10 PROCEDURE — L3908 WHO COCK-UP NONMOLDE PRE OTS: HCPCS

## 2025-01-10 PROCEDURE — 97022 WHIRLPOOL THERAPY: CPT | Mod: GO

## 2025-01-10 PROCEDURE — 97110 THERAPEUTIC EXERCISES: CPT | Mod: GO

## 2025-01-10 NOTE — PROGRESS NOTES
Occupational Therapy Treatment    Patient Name: Dianne Barahona  MRN: [id number]  Today's Date: 1/10/2025  Time Calculation  Start Time: 1115  Stop Time: 1220  Time Calculation (min): 65 min  Problem List Items Addressed This Visit    None  Visit Diagnoses         Codes    Right hand pain     M79.641             Insurance  Visit 3 of 30 (1 visit in 2024)  Authorization: required at 30th visits  Insurance plan: Payor: [employer] [facility] / Plan: [employer] [facility] / Product Type: *No Product type* /     Assessment: Dianne lost PATHAK of her L MF prompting me to tell her to do more work on her hand. She has positive Phalen's at the   R wrist (carpal tunnel syndrome) and Durkan's at the R radial tunnel (radial tunnel syndrome).     Plan: therapeutic exercise, therapeutic activity, self-care, home management, manual therapy, neuromuscular coordination, vasopneumatic, electric stimulation, fluidotherapy, ultrasound, kinesiotaping, orthosis fabrication, wound/scar/edema care  Goals  In 8-10 weeks, Dianne will achieve the following goals:  She will be able to perform self-care, household, work, and leisure tasks with lower pain (1-3/10), 75% of the time.  She will improve right finger range of motion in order to perform self-care, household, work and leisure tasks: D2-5 PATHAK = 225 degrees.  She will decrease her QuickDASH score by 20-30 points (70.45 at eval).  Patient's goals for therapy: get hand better and use it like she had been    Plan of care was developed with input and agreement by the patient  Frequency: 1 x Week  Duration: 10 weeks    Precautions:  Movement Restrictions: No:  Weight Bearing Status: As tolerated    Subjective   A little better. Has been doing HEP daily and wearing orthosis 4-6 hours per day. Can pick things up a little better, still can't make a fist, still can't open jars, etc. Things fall out of her hand and she doesn't feel it like a pen, or she has to put things down because hand  is weak like a pot.    Onset: fell in [employer] [place] onto her R hand. She contacted a  after [employer] refused to show her the video of her fall.    Prior level of function   Full functional movement and use of her R hand, no recollection of ever hurting her RUE in the past    Patient Intake and Medical History form reviewed    Pain  0/10 currently, stiff now, throbs intermittently    Objective   Outcome Measure: QuickDASH: 70.45    Edema: 6.9 cm R MF PIP jt (L 5.8 cm)    Sensation  Gets numb: posterior MF and back of hand , also palm; comes/goes; bilateral fingers feels the same in both hands (distal phalanges)    Neuro exam:  Radial nerve: 5/5 strength in thumb extension, sensation intact to dorsal surface of thumb/index web space  Median nerve: 5/5 strength in thumb abduction and 3/5 opposition, sensation intact to palmar surface of index finger  Ulnar nerve: 3/5 strength in thumb/little finger approximation, sensation intact to palmar surface of little finger     AROM  Shoulder: starting to bother her too, no issue prior to fall    Elbow: WNL    Wrist: WFL but feels painful pull on dorsum of R hand when flexing wrist, and pain (less) at center of dorsal wrist when extending wrist    Right Hand AROM (degrees)   MCP PIP DIP PATHAK   IF  0/55 0/94 0/30    MF 0/55 -43/89 0/50 151   RF 0/56 -14/95 0/28    SF 0/68 -32/85 0/52    Thumb       Thumb RABD       Thumb PABD         Right Hand PROM (degrees)   MCP PIP DIP DPC   IF        MF  -30/      RF  0/     SF  0/     Thumb       Thumb RABD       Thumb PABD           Strength RUE TBE  Hand:   strength (pos 2):     L , R   pinch strength: 3 point: L , R                             roach:      L , R      Special Tests  Finger/Thumb:  ORL Tightness: POS  Richard's Test: NEG  Flexed wrist test for CTS: POS R  Phalen's at cubital tunnel: tingling at dorsal R MF  Phalen's at R radial tunnel: POS R dorsal thumb    Treatment  Education: home exercise program, plan of care,  activity modification, pain management, and pertinent anatomy, stressed the importance of consistent HEP performance more than 1x/day and orthosis wear for the best outcome for her dominant hand.     Modalities: Fluidotherapy and A/AROM of [place] wrists/hands for pain relief and to prepare for treatment  Therapeutic Exercise: Reassessed A/PROM, symptoms, edema, HEP and orthosis compliance. Assessed common areas of nerve compression with special tests. Because of her positive Durkan's test of there the R radial tunnel, I applied 2 I-strips of Kinesiotape in an X pattern with 50% tension over the R radial tunnel for space correction and instructed her in its wear/care/removal. Reviewed HEP, which Dianne performed correctly after re-instruction. I urged her to do it more often than 1x/day and to wear the RMO for longer periods of time.  Orthosis: Fitted her with and issued a prefab WHO due to her positive Phalen's test at the R carpal tunnel, and instructed her in its wear (at night) and care.    HEP: (video on phone) boutonierre routine: blocking R MF/RF/SF MP jt and P1 (palm down) in extension while attempting to lift fingertips off the table to strengthen EDC, DIP flexion with PIP extension blocking, and lumbrical strengthening (MP jts kept flexed with other hand while extending IP joints) x 25 reps each 2x/day    OT charges:  Orthosis: prefab [facility]  OT Therapeutic Procedures Time Entry  Therapeutic Exercise Time Entry: 45  OT Modalities Time Entry  [place] Time Entry: 15

## 2025-01-16 ENCOUNTER — APPOINTMENT (OUTPATIENT)
Dept: OCCUPATIONAL THERAPY | Facility: HOSPITAL | Age: 60
End: 2025-01-16
Payer: COMMERCIAL

## 2025-02-13 ENCOUNTER — APPOINTMENT (OUTPATIENT)
Dept: OCCUPATIONAL THERAPY | Facility: HOSPITAL | Age: 60
End: 2025-02-13
Payer: COMMERCIAL

## 2025-02-19 ENCOUNTER — TREATMENT (OUTPATIENT)
Dept: OCCUPATIONAL THERAPY | Facility: HOSPITAL | Age: 60
End: 2025-02-19
Payer: COMMERCIAL

## 2025-02-19 DIAGNOSIS — M79.641 RIGHT HAND PAIN: ICD-10-CM

## 2025-02-19 PROCEDURE — 97022 WHIRLPOOL THERAPY: CPT | Mod: GO

## 2025-02-19 PROCEDURE — 97110 THERAPEUTIC EXERCISES: CPT | Mod: GO

## 2025-02-19 NOTE — PROGRESS NOTES
Occupational Therapy Treatment    Patient Name: Dianne Barahona  MRN: [id number]  Today's Date: 2/19/2025  Time Calculation  Start Time: 0330  Stop Time: 0400  Time Calculation (min): 30 min  Problem List Items Addressed This Visit    None  Visit Diagnoses         Codes    Right hand pain     M79.641             Insurance  Visit 4 of 30 (1 visit in 2024)  Authorization: required at 30th visits  Insurance plan: Payor: [employer] [facility] / Plan: [employer] [facility] / Product Type: *No Product type* /     Assessment: Dianne again lost PATHAK of her L MF due to not following her home program nor wearing the orthoses enough.     Plan: therapeutic exercise, therapeutic activity, self-care, home management, manual therapy, neuromuscular coordination, vasopneumatic, electric stimulation, fluidotherapy, ultrasound, kinesiotaping, orthosis fabrication, wound/scar/edema care  Goals  In 8-10 weeks, Dianne will achieve the following goals:  She will be able to perform self-care, household, work, and leisure tasks with lower pain (1-3/10), 75% of the time.  She will improve right finger range of motion in order to perform self-care, household, work and leisure tasks: D2-5 PATHAK = 225 degrees.  She will decrease her QuickDASH score by 20-30 points (70.45 at eval).  Patient's goals for therapy: get hand better and use it like she had been    Plan of care was developed with input and agreement by the patient  Frequency: 1 x Week  Duration: 10 weeks    Precautions:  Movement Restrictions: No:  Weight Bearing Status: As tolerated    Subjective   Returns almost 6 weeks since her last visit. A little better. Swollen today. Compression glove is stretched out. Has been doing HEP every 2 days. Wears orthosis a couple times a day, once for 4 days. Now not staying on well. She didn't bring it with her. Can write a little better. Wears WHO 2-3 nights a week. Hard to wear it in bed.     [place]: [place]  [place]. She contacted a  after [employer] refused to show her the video of her fall.    Prior level of function   Full functional movement and use of her R hand, no recollection of ever hurting her RUE in the past    Patient Intake and Medical History form reviewed    Pain  0/10 currently, stiff and swollen now, but feels pins ticking through the ulnar side of R MF PIP    Objective   Outcome Measure: QuickDASH: 70.45    Edema: 7.2 cm R MF PIP jt (L 5.8 cm)    Sensation  Gets numb: posterior MF and back of hand , also palm; comes/goes; bilateral fingers feels the same in both hands (distal phalanges)    Neuro exam:  Radial nerve: 5/5 strength in thumb extension, sensation intact to dorsal surface of thumb/index web space  Median nerve: 5/5 strength in thumb abduction and 3/5 opposition, sensation intact to palmar surface of index finger  Ulnar nerve: 3/5 strength in thumb/little finger approximation, sensation intact to palmar surface of little finger     AROM  Shoulder: starting to bother her too, no issue prior to fall    Elbow: WNL    Wrist: WFL but feels painful pull on dorsum of R hand when flexing wrist, and pain (less) at center of dorsal wrist when extending wrist    Right Hand AROM (degrees)   MCP PIP DIP PATHAK   IF  0/55 0/94 0/30    MF 0/55 -55/89 0/55 151   RF 0/58 0/95 0/43    SF 0/74 -40/85 0/52    Thumb       Thumb RABD       Thumb PABD         Right Hand PROM (degrees)   MCP PIP DIP DPC   IF        MF  -44/      RF  0/     SF  0/     Thumb       Thumb RABD       Thumb PABD           Strength RUE TBE  Hand:   strength (pos 2):     L , R   pinch strength: 3 point: L , R                             roach:      L , R      Special Tests  Finger/Thumb:  ORL Tightness: POS  Richard's Test: NEG  Flexed wrist test for CTS: POS R  Phalen's at cubital tunnel: tingling at dorsal R MF  Phalen's at R radial tunnel: POS R dorsal thumb    Treatment  (Shortened treatment due to late arrival)  Education: home exercise  program, plan of care, activity modification, pain management, and pertinent anatomy, stressed the importance of consistent HEP performance more than 1x/day and orthosis wear for the best outcome for her dominant hand.     Modalities: Fluidotherapy and A/AROM of [place] wrists/hands for pain relief and to prepare for treatment  Therapeutic Exercise: Reassessed A/PROM, symptoms, edema, HEP and orthosis compliance. Reviewed HEP, in which Dianne required re-instruction. Fitted her with and issued a replacement compression glove (med).    HEP: (video on phone) boutonierre routine: blocking R MF/RF/SF MP jt and P1 (palm down) in extension while attempting to lift fingertips off the table to strengthen EDC, DIP flexion with PIP extension blocking, and lumbrical strengthening (MP jts kept flexed with other hand while extending IP joints) x 25 reps each 2x/day    OT charges:    OT Therapeutic Procedures Time Entry  Therapeutic Exercise Time Entry: 18  OT Modalities Time Entry  [employer]pool Time Entry: 12

## 2025-03-12 ENCOUNTER — APPOINTMENT (OUTPATIENT)
Dept: OCCUPATIONAL THERAPY | Facility: HOSPITAL | Age: 60
End: 2025-03-12
Payer: COMMERCIAL

## 2025-04-03 ENCOUNTER — HOSPITAL ENCOUNTER (EMERGENCY)
Facility: HOSPITAL | Age: 60
Discharge: HOME | End: 2025-04-03
Attending: EMERGENCY MEDICINE
Payer: COMMERCIAL

## 2025-04-03 VITALS
TEMPERATURE: 97.3 F | SYSTOLIC BLOOD PRESSURE: 155 MMHG | DIASTOLIC BLOOD PRESSURE: 102 MMHG | OXYGEN SATURATION: 99 % | HEIGHT: 64 IN | BODY MASS INDEX: 33.8 KG/M2 | RESPIRATION RATE: 18 BRPM | WEIGHT: 198 LBS | HEART RATE: 94 BPM

## 2025-04-03 DIAGNOSIS — H66.92 LEFT OTITIS MEDIA, UNSPECIFIED OTITIS MEDIA TYPE: ICD-10-CM

## 2025-04-03 DIAGNOSIS — K02.9 DENTAL CARIES: Primary | ICD-10-CM

## 2025-04-03 PROCEDURE — 99283 EMERGENCY DEPT VISIT LOW MDM: CPT | Performed by: EMERGENCY MEDICINE

## 2025-04-03 PROCEDURE — 99283 EMERGENCY DEPT VISIT LOW MDM: CPT

## 2025-04-03 PROCEDURE — 2500000001 HC RX 250 WO HCPCS SELF ADMINISTERED DRUGS (ALT 637 FOR MEDICARE OP): Mod: SE

## 2025-04-03 PROCEDURE — 99282 EMERGENCY DEPT VISIT SF MDM: CPT | Performed by: EMERGENCY MEDICINE

## 2025-04-03 RX ORDER — AMOXICILLIN AND CLAVULANATE POTASSIUM 875; 125 MG/1; MG/1
1 TABLET, FILM COATED ORAL EVERY 12 HOURS
Qty: 10 TABLET | Refills: 0 | Status: SHIPPED | OUTPATIENT
Start: 2025-04-03 | End: 2025-04-08

## 2025-04-03 RX ORDER — CHLORHEXIDINE GLUCONATE ORAL RINSE 1.2 MG/ML
15 SOLUTION DENTAL 2 TIMES DAILY
Qty: 473 ML | Refills: 0 | Status: SHIPPED | OUTPATIENT
Start: 2025-04-03 | End: 2025-04-17

## 2025-04-03 RX ORDER — ACETAMINOPHEN 325 MG/1
975 TABLET ORAL ONCE
Status: COMPLETED | OUTPATIENT
Start: 2025-04-03 | End: 2025-04-03

## 2025-04-03 RX ORDER — AMOXICILLIN AND CLAVULANATE POTASSIUM 875; 125 MG/1; MG/1
1 TABLET, FILM COATED ORAL ONCE
Status: COMPLETED | OUTPATIENT
Start: 2025-04-03 | End: 2025-04-03

## 2025-04-03 RX ORDER — NAPROXEN 500 MG/1
500 TABLET ORAL
Qty: 30 TABLET | Refills: 0 | Status: SHIPPED | OUTPATIENT
Start: 2025-04-03 | End: 2025-04-18

## 2025-04-03 RX ORDER — NAPROXEN 500 MG/1
500 TABLET ORAL ONCE
Status: COMPLETED | OUTPATIENT
Start: 2025-04-03 | End: 2025-04-03

## 2025-04-03 RX ORDER — NAPROXEN 500 MG/1
500 TABLET ORAL ONCE
Status: DISCONTINUED | OUTPATIENT
Start: 2025-04-03 | End: 2025-04-03

## 2025-04-03 RX ADMIN — ACETAMINOPHEN 975 MG: 325 TABLET, FILM COATED ORAL at 20:47

## 2025-04-03 RX ADMIN — AMOXICILLIN AND CLAVULANATE POTASSIUM 1 TABLET: 875; 125 TABLET, FILM COATED ORAL at 20:13

## 2025-04-03 RX ADMIN — NAPROXEN 500 MG: 500 TABLET ORAL at 20:13

## 2025-04-03 ASSESSMENT — COLUMBIA-SUICIDE SEVERITY RATING SCALE - C-SSRS
1. IN THE PAST MONTH, HAVE YOU WISHED YOU WERE DEAD OR WISHED YOU COULD GO TO SLEEP AND NOT WAKE UP?: NO
2. HAVE YOU ACTUALLY HAD ANY THOUGHTS OF KILLING YOURSELF?: NO
6. HAVE YOU EVER DONE ANYTHING, STARTED TO DO ANYTHING, OR PREPARED TO DO ANYTHING TO END YOUR LIFE?: NO

## 2025-04-03 ASSESSMENT — PAIN DESCRIPTION - LOCATION
LOCATION: GENERALIZED
LOCATION: TEETH

## 2025-04-03 ASSESSMENT — PAIN - FUNCTIONAL ASSESSMENT: PAIN_FUNCTIONAL_ASSESSMENT: 0-10

## 2025-04-03 ASSESSMENT — PAIN SCALES - GENERAL
PAINLEVEL_OUTOF10: 8
PAINLEVEL_OUTOF10: 9

## 2025-04-04 NOTE — ED PROVIDER NOTES
HPI   Chief Complaint   Patient presents with    Earache    Dental Pain       Patient is a 59-year-old female with no reported past medical history presenting with primary concern for left ear pain for several days as well as secondary concern for cracked tooth on sucker earlier today.  Denies fevers, chills but endorses significant pain in the left ear without clear provocation.  Also reports cracked tooth and is having significant pain there since biting on a sucker earlier today.  Reports that ear pain and tooth pain are not associated and that ear pain preceded tooth pain which was clearly associated with cracking tooth.  Denies any facial swelling, trouble breathing, trouble swallowing.  Does report she has seen a dentist but not in the last several years.            Patient History   History reviewed. No pertinent past medical history.  History reviewed. No pertinent surgical history.  No family history on file.  Social History     Tobacco Use    Smoking status: Never    Smokeless tobacco: Never   Substance Use Topics    Alcohol use: Not Currently    Drug use: Not Currently       Physical Exam   ED Triage Vitals [04/03/25 1940]   Temperature Heart Rate Respirations BP   36.3 °C (97.3 °F) 94 18 (!) 155/102      Pulse Ox Temp src Heart Rate Source Patient Position   99 % -- -- --      BP Location FiO2 (%)     -- --       Physical Exam  Constitutional:       Appearance: Normal appearance.   HENT:      Head: Normocephalic and atraumatic.      Ears:      Comments: Right tympanic membrane usually position, pearly gray in color, positive light reflex, no effusion or bulging.  Left tympanic membrane with spidery vasculature, mild bulge, positive light reflex but small effusion.     Mouth/Throat:      Comments: Diffuse chronically poor dentition with multiple fillings.  Right upper third molar with absent crown, grayish discoloration of pulp.  Absent right upper second molar.  Eyes:      Extraocular Movements:  Extraocular movements intact.   Cardiovascular:      Rate and Rhythm: Normal rate.   Pulmonary:      Effort: Pulmonary effort is normal.   Musculoskeletal:         General: Normal range of motion.      Cervical back: Normal range of motion.   Skin:     General: Skin is warm and dry.   Neurological:      General: No focal deficit present.      Mental Status: She is alert and oriented to person, place, and time.   Psychiatric:         Mood and Affect: Mood normal.         Behavior: Behavior normal.         ED Course & MDM   Diagnoses as of 04/03/25 2005   Dental caries   Left otitis media, unspecified otitis media type                 No data recorded     [place] Coma Scale Score: 15 (04/03/25 1941 : Liza Echeverria RN)                           Medical Decision Making  Patient is a 59-year-old female with no reported past medical history presenting with primary concern for left ear pain for several days as well as secondary concern for cracked tooth on sucker earlier today.  Does have evidence of otitis media with erythema, mild bulge and pain.  Treated with dose of Augmentin here and prescribed 5-day course.  No evidence of systemic infection.  Otherwise has Harden 2 dental fracture with exposed pulp of right upper third molar.  Not felt amenable to sealing given gray discoloration of underlying pulp concerning for chronic dental xochitl as cause of tooth fracturing.  Does report having follow-up with dentistry but provided information on walk-in clinics as well.  Prescribed Peridex for empiric treatment until she follows up with a dentist.  Prescribed naproxen for pain and given dose here.  Return precautions, appropriate follow-up discussed with patient and patient discharged home.    Patient seen and discussed with Dr. Benjamin Leary MD, PhD  Emergency Medicine PGY3          Procedure  Procedures     Zaheer Leary MD  Resident  04/03/25 4890    Emergency Medicine Attending Attestation:     Diagnoses as of  04/05/25 1409   Dental caries   Left otitis media, unspecified otitis media type       The patient was seen by the resident/fellow.  I have personally performed a substantive portion of the encounter.  I have seen and examined the patient; agree with the workup, evaluation, MDM, management and diagnosis.  The care plan has been discussed with the resident; I have reviewed the resident’s note and agree with the documented findings.                    MD Xochitl Ernst MD  04/05/25 4159